# Patient Record
Sex: FEMALE | Race: OTHER | URBAN - METROPOLITAN AREA
[De-identification: names, ages, dates, MRNs, and addresses within clinical notes are randomized per-mention and may not be internally consistent; named-entity substitution may affect disease eponyms.]

---

## 2022-07-12 LAB
ABO + RH BLD: ABNORMAL
ABO/RH METHOD: NORMAL
ANTIBODY SCREEN METHOD: NORMAL
APPEARANCE UR: CLEAR
BACTERIA #/AREA URNS HPF: ABNORMAL /[HPF]
BILIRUB UR STRIP.AUTO-MCNC: NEGATIVE MG/DL
BLD GP AB SCN SERPL QL: NEGATIVE
COLOR UR: ABNORMAL
EPI CELLS #/AREA URNS HPF: ABNORMAL /LPF
ERYTHROCYTE [DISTWIDTH] IN BLOOD BY AUTOMATED COUNT: 13.4 % (ref 11–16)
GLUCOSE UR STRIP.AUTO-MCNC: NEGATIVE MG/DL
HCT VFR BLD AUTO: 37.1 % (ref 34–47)
HGB BLD-MCNC: 12.2 G/DL (ref 11.5–15.7)
KETONES UR STRIP.AUTO-MCNC: NEGATIVE MG/DL
LEUKOCYTE ESTERASE UR QL STRIP: NEGATIVE
MCH RBC QN AUTO: 31.3 PG (ref 27–34.5)
MCHC RBC AUTO-ENTMCNC: 32.9 G/DL (ref 32–36)
MCV RBC AUTO: 95.1 FL (ref 81–99)
MUCOUS THREADS #/AREA URNS LPF: ABNORMAL /LPF
NITRITE UR QL STRIP.AUTO: NEGATIVE
PH UR STRIP.AUTO: 7.5 [PH] (ref 4.5–8)
PLATELET # BLD AUTO: 302 X10E3/MCL (ref 140–440)
PMV BLD AUTO: 9.4 FL (ref 7.2–13.2)
PROT UR QL STRIP: NEGATIVE
RBC # BLD AUTO: 3.9 X10E6/MCL (ref 3.6–5.2)
RBC # UR STRIP: NEGATIVE /UL
RBC #/AREA URNS HPF: ABNORMAL /HPF (ref 0–2)
SP GR UR STRIP: 1.01 (ref 1–1.03)
UROBILINOGEN UR STRIP-MCNC: 0.2 EU/DL
WBC # BLD AUTO: 9.9 X10E3/MCL (ref 3.8–10.6)
WBC #/AREA URNS HPF: ABNORMAL /HPF (ref 0–2)

## 2022-10-20 NOTE — PROGRESS NOTES
Progress Note-Nurse                 Pt ambulated to bathroom with RN assistance. Gait steady. Pt unable to void. Pt taught and given pericare. Pt given pads, underwear, and new gown. Pt brought to 2214 via wheelchair. All belongings with family.        Signature Line     Electronically Signed on 07/12/2022 02:49 PM EDT   ________________________________________________   Radha Leon RN, Miguel Freire

## 2022-10-20 NOTE — ANESTHESIA PREPROCEDURE EVALUATION
Preanesthesia Evaluation        Harpreet Goldman             MRN: [id number]            FIN: [id number]               Age:   33 years     Sex:  Female     :  1990   Associated Diagnoses:   None   Author:   Yfn OCAMPO      Preoperative Information   NPO:  Waived for emergency. Anesthesia history     Patient's history: No anesthesia-related complications. Family's history: negative. History of Present Illness   The patient presents for preanesthesia evaluation with Pt presents in labor for PRADIP. Health Status   Allergies: Allergic Reactions (Selected)  No Known Allergies,    Allergies    (Active and Proposed Allergies Only)  No Known Allergies   (Severity: Unknown severity, Onset: Unknown)     Current medications:    Home Medications (1) Active  Prenatal 1   ,    Medications (33) Active  Scheduled: (8)  acetaminophen 325 mg Tab  650 mg 2 tabs, Oral, On Call  atropine-diphenoxylate 0.025 mg-2.5 mg Tab  2 tabs, Oral, On Call  atropine-diphenoxylate 0.025 mg-2.5 mg Tab  1 tabs, Oral, On Call  azithromycin 500mg/250ml NS ADM  500 mg 250 mL, IV Piggyback, On Call  ceFAZolin duplex  2 g 50 mL, IV Piggyback, On Call  oxytocin 30 units + premix sodium chloride 0.9%. .... 500 mL  500 mL, IV Bolus  promethazine 25 mg/mL Inj Soln 1 mL  50 mg 2 mL, IM, On Call  Sodium Chloride 0.9% intravenous solution 1,000 mL  1,000 mL, IV  Continuous: (5)  bupivacaine-fentaNYL 0.125%-2 mcg/mL-NaCl 0.9% 100 mL  100 mL, Epidural, 12 mL/hr  Dextrose 5% with 0.9% NaCl intravenous solution 1,000 mL  1,000 mL, IV, 125 mL/hr  oxytocin 30 units + premix sodium chloride 0.9%. .... 500 mL  500 mL, IV  oxytocin 30 units + premix sodium chloride 0.9%. ....  500 mL  500 mL, IV  Sodium Chloride 0.9% intravenous solution 1,000 mL  1,000 mL, IV Bolus, 999 mL/hr  PRN: (20)  acetaminophen 325 mg Tab  650 mg 2 tabs, Oral, q4hr  acetaminophen 500 mg Tab  1,000 mg 2 tabs, Oral, q6hr  acetaminophen 500 mg Tab  1,000 mg 2 tabs, Oral, On Call  ammonia aromatic Solution  1 EA, Inhale, On Call  carboprost 250 mcg/mL Inj Soln 1 mL  250 mcg 1 mL, IM, On Call  ePHEDrine 50 mg/mL Inj Soln 1 mL  5 mg 0.1 mL, IV Push, q5min  lidocaine 1% Inj Soln 50 mL  50 mL, IM, On Call  methylergonovine 0.2 mg Tab  0.2 mg 1 tabs, Oral, q4hr  methylergonovine 0.2 mg/mL Inj Soln 1 mL  0.2 mg 1 mL, IM, On Call  mineral oil Oral Liquid 30 mL  30 mL, Topical, On Call  miSOPROStol  200 mcg Tab  800 mcg 4 tabs, NV, On Call  nalbuphine 10 mg/mL Inj Soln 1 mL  5 mg 0.5 mL, IV Push, q3hr  NIFEdipine 10 mg Cap  10 mg 1 caps, Oral, On Call  NIFEdipine 10 mg Cap  10 mg 1 caps, Oral, On Call  ondansetron 2 mg/mL Inj Soln 2 mL  4 mg 2 mL, IV Push, q6hr  oxytocin 10 units/mL Inj Soln 1 mL  10 units 1 mL, IM, On Call  phenylephrine  100 mcg, IV Push, q5min  silver nitrate Stick  1 ramin, Topical, On Call  Sodium Chloride 0.9% intravenous solution  1,000 mL, IV Bolus, On Call  tranexamic acid 1000mg/ 100 mL NS ADM + Premix ADM sodium chloride inj. .. Lawernce Roughen Lawernce Roughen Lawernce Roughen 100 mL  1,000 mg 100 mL, IV Piggyback, On Call     Problem list:    Active Problems (4)  Alteration in comfort: pain   Gestational diabetes mellitus, class A>1<   Pregnant   Shortness of breath   ,    Problems   (Active Problems Only)    Gestational diabetes mellitus, class A>1< (Related to Pregnant problem)  (SNOMED CT: 473086712, Onset: 07/12/22)   Comment:  Problem added by Discern Expert  ([address] on 07/12/22)   Pregnant   (SNOMED CT: 024455045, Onset: 10/10/21)  Alteration in comfort: pain   (SNOMED CT: 10752034, Onset: --)   Comment:  Problem added automatically by system based on initiation of Alteration in Comfort Plan of Care                       (SYSTEM,  SYSTEM on 12/08/18)   Shortness of breath   (Other: 24142, Onset: --)        Histories   Past Medical History:    Resolved  Gestational diabetes mellitus, class A>2< ([id number]): Onset on 12/8/2018 at 28 years.   Resolved on 12/8/2018 at 28 years. Comments:  12/8/2018 EST 11:31 EST - SYSTEM,  SYSTEM  Problem added by Discern Expert  Pregnant ([id number]): Onset on 12/8/2018 at 28 years. Resolved on 12/8/2018 at 28 years. Pregnant ([id number]): Onset on 6/5/2013 at 22 years. Resolved on 3/12/2014 at 23 years. Procedure history:    No active procedure history items have been selected or recorded. Social History        Social & Psychosocial Habits    Alcohol  12/08/2018  Use: Denies    Substance Use  12/08/2018  Use: Denies    Tobacco  12/08/2018  Use: Never smoker    Electronic Cigarette/Vaping  07/12/2022  Electronic Cigarette Use: Never  .      Symptoms of Sleep Apnea Score: PAT Documentation: 0                        6/29/2022 12:55 EDT        Physical Examination   Vital Signs   7/77/2065 1:12 EDT Systolic Blood Pressure 786 mmHg  HI    Diastolic Blood Pressure 83 mmHg    Heart Rate Monitored 88 bpm    Mean Arterial Pressure, Cuff 109 mmHg  HI   6/65/4535 1:20 EDT Systolic Blood Pressure 228 mmHg    Diastolic Blood Pressure 78 mmHg    Heart Rate Monitored 83 bpm    Mean Arterial Pressure, Cuff 96 mmHg   3/17/4208 8:56 EDT Systolic Blood Pressure 950 mmHg    Diastolic Blood Pressure 89 mmHg    Temperature Oral 36.8 degC    Heart Rate Monitored 81 bpm    Respiratory Rate 16 br/min    Mean Arterial Pressure, Cuff 106 mmHg  HI    SpO2 98 %         Vital Signs (last 24 hrs)_____  Last Charted___________  Temp Oral     36.8 degC  (JUL 12 01:51)  Resp Rate         16 br/min  (JUL 12 01:51)  SBP      H 154mmHg  (JUL 12 04:00)  DBP      83 mmHg  (JUL 12 04:00)  SpO2      98 %  (JUL 12 01:51)  Weight      59 kg  (JUL 12 02:52)  Height      155 cm  (JUL 12 02:52)  BMI      24.56  (JUL 12 02:52)     Measurements from flowsheet : Measurements   7/12/2022 2:52 EDT Height/Length Measured 155 cm    Weight Measured 59 kg    Weight Dosing 59 kg    Body Mass Index Measured 24.56 kg/m2   7/12/2022 1:45 EDT Body Mass Index est ana 24.56 kg/m2    Body Mass Index Measured 24.56 kg/m2   2022 1:39 EDT Height/Length Measured 155 cm    Weight Dosing 59 kg      Pain assessment:  Pain Assessment   2022 1:39 EDT Preferred Pain Tool Numeric rating scale    Numeric Rating Pain Scale 6    Primary Pain Location Abdomen    Primary Pain Quality Cramping, Discomfort      . General:          Stress: No acute distress. Appearance: Within normal limits. Airway:          Mallampati classification: II (soft palate, fauces, uvula visible). Thyromental Distance: Normal.         Mouth: Teeth ( Within normal limits ). Throat: Within normal limits. Head:  Normocephalic, Atraumatic. Neck:  Full range of motion. Respiratory:  Lungs are clear to auscultation, Breath sounds are equal.    Cardiovascular:  Normal rate, No murmur, Regular rhythm. Gastrointestinal:  Deferred. Musculoskeletal     Normal range of motion. No tenderness. No swelling. Neurologic:  Alert, Oriented. Metabolic Equivalents in Exercise Testin-7.        Review / Management   Results review:     Labs (Last four charted values)  WBC                  9.9 ()   Hgb                  12.2 ()   Hct                  37.1 ()   Plt                  302 () , Lab results   2022 3:09 EDT WBC 9.9 x10e3/mcL    RBC 3.90 x10e6/mcL    Hgb 12.2 g/dL    Hct 37.1 %    MCV 95.1 fL    MCH 31.3 pg    MCHC 32.9 g/dL    RDW 13.4 %    Platelet 066 D95G0/PGR    MPV 9.4 fL    ABO/Rh A POS    Antibody Screen Negative   2022 2:52 EDT Estimated Creatinine Clearance 165.20 mL/min   2022 1:55 EDT UA Color Straw    UA Appear Clear    UA Glucose Negative    UA Bili Negative    UA Ketones Negative    UA Spec Grav 1.015    UA Blood Negative    UA pH 7.5    Protein U Negative    UA Urobilinogen 0.2 EU/dL    UA Nitrite Negative    UA Leuk Est Negative    WBC U 0-2 /HPF    RBC U Not Seen /HPF    Sq Epi U Many /LPF    Bacteria U Rare    Mucus U Not Seen /LPF 7/12/2022 1:45 EDT Estimated Creatinine Clearance 165.20 mL/min   . Assessment and Plan   American Society of Anesthesiologists#(ASA) physical status classification:  Class II, E. Anesthetic Preoperative Plan     Anesthetic technique: Neuraxial.     Maintenance airway: Nasal Cannula. Regional: Epidural.     Opioid Assessment: Opioid NaÃ¯ve. Risks discussed: nausea, vomiting, headache, hypotension, serious complications, PDPHA, infection, hematoma.      Signature Line     Electronically Signed on 07/12/2022 05:48 AM EDT   ________________________________________________   Yoon OCAMPO

## 2022-10-20 NOTE — ED NOTES
ED Triage Note       BYRON Triage Entered On:  7/12/2022 1:45 EDT    Performed On:  7/12/2022 1:39 EDT by Jerome Bashir               BYRON Triage   Barriers to Learning :   None evident   Chief Complaint :   irregular ctxs that started in the morning hours   Mode of Arrival :   Wheelchair   Infectious Disease Documentation :   Document assessment   BYRON Reason for Visit :   Contractions   Patient received chemo or biotherapy last 48 hrs? :   No   Dosing Weight Obtained By :   Patient stated   Weight Dosing :   59 kg(Converted to: 130 lb 1 oz)    Height :   155 cm(Converted to: 5 ft 1 in)    Body Mass Index Dosing :   25 kg/m2   Numeric Rating Pain Scale :   6   ED Pain Details :   Pain Details   Jerome Bashir - 7/12/2022 1:39 EDT   DCP GENERIC CODE   Tracking Acuity :   4   Tracking Group :   ED OB [employer] [facility]   Jerome Bashir - 7/12/2022 1:39 EDT   ED Allergies Section :   Document assessment   ED Home Meds Section :   Document assessment   Subjective Document Assessment :   Document assessment   OB History Document Assessment :   Document assessment   OB Ante Risk Factors Doc Assess :   Document assessment   OB Prenatal HX Doc Assess :   Document assessment   ED General Section :   Document assessment   ED History Section :   Document assessment   Immunization Document Assessment :   Document assessment   ED Advance Directives Section :   Document assessment   ED Bloodless Medicine :   Document assessment   ED Valuables / Belongings Sections :   Document assessment   ED Assessment Section :   Document assessment   Jerome Bashir - 7/12/2022 1:39 EDT   ID Risk Screen Symptoms   Recent Travel History :   No recent travel   TB Symptom Screen :   No symptoms   Jerome Bashir 7/12/2022 1:39 EDT   Pain Assessment   Preferred Pain Tool :   Numeric rating scale   Pain Location :   Abdomen   Quality :   Cramping, Discomfort   Jerome Bashir - 7/12/2022 1:39 EDT   Image 4 - Images currently included in the form version of this document have not been included in the text rendition version of the form. Allergies   (As Of: 7/12/2022 01:45:38 EDT)   Allergies (Active)   No Known Allergies  Estimated Onset Date:   Unspecified ; Created By:   Ailyn Aguillon RN, Itz Brooks; Reaction Status:   Active ; Category:   Drug ; Substance:   No Known Allergies ; Type: Allergy ; Updated By:   Maya Enciso; Reviewed Date:   7/12/2022 1:41 EDT        ED Home Med List   Medication List   (As Of: 7/12/2022 01:45:38 EDT)   Home Meds    multivitamin, prenatal  :   multivitamin, prenatal ; Status:   Documented ; Ordered As Mnemonic:   Prenatal 1 ; Simple Display Line:   0 Refill(s) ;  Catalog Code:   multivitamin, prenatal ; Order Dt/Tm:   12/8/2018 11:16:58 EST            Subjective Data   Reason For Visit OB :   Labor check   Indications for Induction :   Present   Last Fetal Movement Date/Time :   7/12/2022 1:41 EDT   Contractions :   Yes   Contraction Onset Date/Time :   7/12/2022 9:00 EDT   Contraction Frequency (min) :   q 10 mins   Urge to Push :   No   Bleeding :   No   Leaking Fluid :   No   [place] last 24 hours :   No   Jerome Bashir - 7/12/2022 1:39 EDT   Antepartum Risk Factors   Antepartum Risk Factors :   Diabetes, gestational, non-insulin dependent   Reg PC Prior Uterine Surgery vE :   None   Jerome Bashir - 7/12/2022 1:39 EDT   Prenatal Info   Primary OB Provider :   Trista Aponte Records Available :   Yes   Prenatal Care :   4 visits or more   Blood Type, External :   A positive   Antibody Screen, External :   Negative   Rubella, External :   Immune   RPR, External :   Negative   Hepatitis B, External :   Negative   Transcribed, Hepatitis C :   Unknown   HIV Antibodies, External :   Negative   Gonorrhea, External :   Negative   Chlamydia, External :   Negative   Herpes Simplex Virus, External :   Unknown   Tdap Vaccine, External :   Yes   Tdap Vaccine Date Performed : 5/2/2022 EDT   Toxicology Screen, External :   No   Group B Strep, External :   Unknown   Rhogam, External :   N/A   Antepartum Steroids, External :   None   Jerome Bashir - 7/12/2022 1:39 EDT   Harm Screen   Feels Safe Where Live :   Yes   Last 3 mo, thoughts killing self/others :   Patient denies   Jerome Bashir - 7/12/2022 1:39 EDT   Social History   Social History   (As Of: 7/12/2022 01:45:38 EDT)   Tobacco:        Never smoker   (Last Updated: 12/8/2018 11:27:48 EST by Kevin WATKINS)          Electronic Cigarette/Vaping:        Never Electronic Cigarette Use.    (Last Updated: 7/12/2022 01:44:18 EDT by Morena Hubbard)          Alcohol:        Denies   (Last Updated: 12/8/2018 11:27:48 EST by Kevin WATKINS)          Substance Use:        Denies   (Last Updated: 12/8/2018 11:27:48 EST by Kevin WATKINS)            Advance Directive   Advance Directive :   No   Jerome Bashir - 7/12/2022 1:39 EDT   Bloodless Medicine   Is Blood Transfusion Acceptable to Patient :   Yes   Jerome Bashir - 7/12/2022 1:39 EDT   Valuables and Belongings   Valuables and Belongings   At Bedside :   Clothes, Purse/Wallet, Cell phone, Nae Danas wedding ring and ban lt hand   Jerome Bashir 7/12/2022 1:39 EDT   Assessment   Level of Consciousness :   Alert   Orientation :   Oriented x 4   Affect/Behavior :   Appropriate, Cooperative   Skin Color :   Normal for ethnicity   Skin Temperature :   Warm   Jerome Bashir 7/12/2022 1:39 EDT

## 2024-10-25 ENCOUNTER — NEW PATIENT (OUTPATIENT)
Facility: LOCATION | Age: 34
End: 2024-10-25

## 2024-10-25 DIAGNOSIS — E11.9: ICD-10-CM

## 2024-10-25 DIAGNOSIS — H04.123: ICD-10-CM

## 2024-10-25 PROCEDURE — 92004 COMPRE OPH EXAM NEW PT 1/>: CPT
